# Patient Record
Sex: MALE | Race: WHITE | Employment: UNEMPLOYED | URBAN - METROPOLITAN AREA
[De-identification: names, ages, dates, MRNs, and addresses within clinical notes are randomized per-mention and may not be internally consistent; named-entity substitution may affect disease eponyms.]

---

## 2020-10-08 ENCOUNTER — HOSPITAL ENCOUNTER (EMERGENCY)
Age: 65
Discharge: HOME | End: 2020-10-09
Attending: EMERGENCY MEDICINE
Payer: COMMERCIAL

## 2020-10-08 ENCOUNTER — APPOINTMENT (OUTPATIENT)
Dept: CT IMAGING | Age: 65
End: 2020-10-08
Payer: COMMERCIAL

## 2020-10-08 VITALS
TEMPERATURE: 98.6 F | SYSTOLIC BLOOD PRESSURE: 131 MMHG | RESPIRATION RATE: 16 BRPM | DIASTOLIC BLOOD PRESSURE: 96 MMHG | OXYGEN SATURATION: 96 % | HEART RATE: 85 BPM

## 2020-10-08 LAB
A/G RATIO: 1.6 (ref 1.1–2.2)
ABO/RH: NORMAL
ALBUMIN SERPL-MCNC: 4.2 G/DL (ref 3.4–5)
ALP BLD-CCNC: 66 U/L (ref 40–129)
ALT SERPL-CCNC: 12 U/L (ref 10–40)
ANION GAP SERPL CALCULATED.3IONS-SCNC: 11 MMOL/L (ref 3–16)
ANTIBODY SCREEN: NORMAL
AST SERPL-CCNC: 15 U/L (ref 15–37)
BASOPHILS ABSOLUTE: 0 K/UL (ref 0–0.2)
BASOPHILS RELATIVE PERCENT: 0.3 %
BILIRUB SERPL-MCNC: 0.3 MG/DL (ref 0–1)
BUN BLDV-MCNC: 26 MG/DL (ref 7–20)
CALCIUM SERPL-MCNC: 9.6 MG/DL (ref 8.3–10.6)
CHLORIDE BLD-SCNC: 99 MMOL/L (ref 99–110)
CO2: 27 MMOL/L (ref 21–32)
CREAT SERPL-MCNC: 1 MG/DL (ref 0.8–1.3)
EOSINOPHILS ABSOLUTE: 0.1 K/UL (ref 0–0.6)
EOSINOPHILS RELATIVE PERCENT: 1.3 %
GFR AFRICAN AMERICAN: >60
GFR NON-AFRICAN AMERICAN: >60
GLOBULIN: 2.7 G/DL
GLUCOSE BLD-MCNC: 110 MG/DL (ref 70–99)
HCT VFR BLD CALC: 39.1 % (ref 40.5–52.5)
HEMOGLOBIN: 13.1 G/DL (ref 13.5–17.5)
LYMPHOCYTES ABSOLUTE: 0.8 K/UL (ref 1–5.1)
LYMPHOCYTES RELATIVE PERCENT: 7.8 %
MCH RBC QN AUTO: 33.3 PG (ref 26–34)
MCHC RBC AUTO-ENTMCNC: 33.6 G/DL (ref 31–36)
MCV RBC AUTO: 99.3 FL (ref 80–100)
MONOCYTES ABSOLUTE: 0.5 K/UL (ref 0–1.3)
MONOCYTES RELATIVE PERCENT: 4.8 %
NEUTROPHILS ABSOLUTE: 8.6 K/UL (ref 1.7–7.7)
NEUTROPHILS RELATIVE PERCENT: 85.8 %
PDW BLD-RTO: 13.9 % (ref 12.4–15.4)
PLATELET # BLD: 193 K/UL (ref 135–450)
PMV BLD AUTO: 7.9 FL (ref 5–10.5)
POTASSIUM SERPL-SCNC: 4.5 MMOL/L (ref 3.5–5.1)
RBC # BLD: 3.94 M/UL (ref 4.2–5.9)
REASON FOR REJECTION: NORMAL
REJECTED TEST: NORMAL
SODIUM BLD-SCNC: 137 MMOL/L (ref 136–145)
TOTAL PROTEIN: 6.9 G/DL (ref 6.4–8.2)
WBC # BLD: 10 K/UL (ref 4–11)

## 2020-10-08 PROCEDURE — 96375 TX/PRO/DX INJ NEW DRUG ADDON: CPT

## 2020-10-08 PROCEDURE — 99291 CRITICAL CARE FIRST HOUR: CPT

## 2020-10-08 PROCEDURE — 86901 BLOOD TYPING SEROLOGIC RH(D): CPT

## 2020-10-08 PROCEDURE — 85025 COMPLETE CBC W/AUTO DIFF WBC: CPT

## 2020-10-08 PROCEDURE — 72125 CT NECK SPINE W/O DYE: CPT

## 2020-10-08 PROCEDURE — 86850 RBC ANTIBODY SCREEN: CPT

## 2020-10-08 PROCEDURE — 96374 THER/PROPH/DIAG INJ IV PUSH: CPT

## 2020-10-08 PROCEDURE — 86900 BLOOD TYPING SEROLOGIC ABO: CPT

## 2020-10-08 PROCEDURE — 70450 CT HEAD/BRAIN W/O DYE: CPT

## 2020-10-08 PROCEDURE — 6360000002 HC RX W HCPCS: Performed by: EMERGENCY MEDICINE

## 2020-10-08 PROCEDURE — 71260 CT THORAX DX C+: CPT

## 2020-10-08 PROCEDURE — 6360000002 HC RX W HCPCS: Performed by: PHYSICIAN ASSISTANT

## 2020-10-08 PROCEDURE — 80053 COMPREHEN METABOLIC PANEL: CPT

## 2020-10-08 PROCEDURE — 36415 COLL VENOUS BLD VENIPUNCTURE: CPT

## 2020-10-08 RX ORDER — QUETIAPINE FUMARATE 25 MG/1
25 TABLET, FILM COATED ORAL EVERY MORNING
COMMUNITY

## 2020-10-08 RX ORDER — HYDROMORPHONE HYDROCHLORIDE 1 MG/ML
1 INJECTION, SOLUTION INTRAMUSCULAR; INTRAVENOUS; SUBCUTANEOUS
Status: DISCONTINUED | OUTPATIENT
Start: 2020-10-08 | End: 2020-10-09 | Stop reason: HOSPADM

## 2020-10-08 RX ORDER — DONEPEZIL HYDROCHLORIDE 5 MG/1
5 TABLET, FILM COATED ORAL NIGHTLY
COMMUNITY

## 2020-10-08 RX ORDER — ACETAMINOPHEN 325 MG/1
650 TABLET ORAL EVERY 6 HOURS PRN
COMMUNITY

## 2020-10-08 RX ORDER — DULOXETIN HYDROCHLORIDE 60 MG/1
60 CAPSULE, DELAYED RELEASE ORAL DAILY
COMMUNITY

## 2020-10-08 RX ORDER — FENTANYL CITRATE 50 UG/ML
50 INJECTION, SOLUTION INTRAMUSCULAR; INTRAVENOUS ONCE
Status: COMPLETED | OUTPATIENT
Start: 2020-10-08 | End: 2020-10-08

## 2020-10-08 RX ORDER — LEVOTHYROXINE SODIUM 0.15 MG/1
150 TABLET ORAL DAILY
COMMUNITY

## 2020-10-08 RX ORDER — ONDANSETRON 4 MG/1
4 TABLET, FILM COATED ORAL EVERY 8 HOURS PRN
COMMUNITY

## 2020-10-08 RX ORDER — HYDROMORPHONE HYDROCHLORIDE 1 MG/ML
1 INJECTION, SOLUTION INTRAMUSCULAR; INTRAVENOUS; SUBCUTANEOUS ONCE
Status: COMPLETED | OUTPATIENT
Start: 2020-10-08 | End: 2020-10-08

## 2020-10-08 RX ORDER — QUETIAPINE FUMARATE 25 MG/1
25 TABLET, FILM COATED ORAL NIGHTLY
COMMUNITY

## 2020-10-08 RX ORDER — OXYCODONE HCL 20 MG/1
20 TABLET, FILM COATED, EXTENDED RELEASE ORAL EVERY 12 HOURS
COMMUNITY

## 2020-10-08 RX ADMIN — HYDROMORPHONE HYDROCHLORIDE 1 MG: 1 INJECTION, SOLUTION INTRAMUSCULAR; INTRAVENOUS; SUBCUTANEOUS at 20:03

## 2020-10-08 RX ADMIN — FENTANYL CITRATE 50 MCG: 50 INJECTION, SOLUTION INTRAMUSCULAR; INTRAVENOUS at 17:24

## 2020-10-08 RX ADMIN — HYDROMORPHONE HYDROCHLORIDE 1 MG: 1 INJECTION, SOLUTION INTRAMUSCULAR; INTRAVENOUS; SUBCUTANEOUS at 18:21

## 2020-10-08 ASSESSMENT — ENCOUNTER SYMPTOMS
DIARRHEA: 0
VOMITING: 0
COUGH: 0
ABDOMINAL PAIN: 0
NAUSEA: 0
RHINORRHEA: 0
WHEEZING: 0
SHORTNESS OF BREATH: 0

## 2020-10-08 ASSESSMENT — PAIN SCALES - GENERAL
PAINLEVEL_OUTOF10: 10

## 2020-10-08 NOTE — ED PROVIDER NOTES
[address] ENCOUNTER        Pt Name: Daniel Santillan  MRN: [id number]  [place] 1955  Date of evaluation: 10/8/2020  Provider: Beth Francois PA-C  PCP: Renetta Titus     I have seen and evaluated this patient with my supervising physician Terrance Curiel MD.    [address] [place] [address]       Chief Complaint   Patient presents with   Conora My     PT arrived via EMS from [facility] with c/o falling and hitting head. PT in C Collar upon arrival to ED. PT with L shoulder pain, head and ear pain. PT with Left nare bleeding and Left ear bleeding from inside ear. HISTORY OF PRESENT ILLNESS   (Location, Timing/Onset, Context/Setting, Quality, Duration, Modifying Factors, Severity, Associated Signs and Symptoms)  Note limiting factors. Daniel Santillan is a 60 y.o. male who presents for evaluation of head injury status post mechanical fall that occurred just prior to arrival.  Patient states he was sitting on the edge of a 3 to 4 foot wall when he fell backwards, landing directly on his head and left shoulder. They he denies any loss of consciousness. He is not anticoagulated. He does report head and left shoulder pain. He states he is not able to move his shoulder secondary to pain. He denies any mid or low back pain. No numbness tingling or weakness distally. No saddle anesthesia, bladder retention or bowel incontinence. No dizziness/lightheadedness, weakness or visual disturbances. EMS reports the patient was very diaphoretic upon time of their arrival.  Patient denies any chest pain or shortness of breath. He is not currently diaphoretic. He denies any nausea or vomiting. No other injuries or complaints at this time. Nursing Notes were all reviewed and agreed with or any disagreements were addressed in the HPI.     REVIEW OF SYSTEMS    (2-9 systems for level 4, 10 or more for level 5)     Review of Systems Constitutional: Negative for appetite change, chills and fever. HENT: Positive for ear discharge and ear pain. Negative for congestion and rhinorrhea. Respiratory: Negative for cough, shortness of breath and wheezing. Cardiovascular: Negative for chest pain. Gastrointestinal: Negative for abdominal pain, diarrhea, nausea and vomiting. Genitourinary: Negative for difficulty urinating, dysuria and hematuria. Musculoskeletal: Positive for arthralgias (L SHOULDER) and neck pain. Negative for neck stiffness. Skin: Negative for rash. Neurological: Positive for headaches. Negative for dizziness, syncope, weakness and light-headedness. Positives and Pertinent negatives as per HPI. Except as noted above in the ROS, all other systems were reviewed and negative. PAST MEDICAL HISTORY   No past medical history on file. SURGICAL HISTORY   No past surgical history on file. CURRENTMEDICATIONS       Previous Medications    ACETAMINOPHEN (TYLENOL) 325 MG TABLET    Take 650 mg by mouth every 6 hours as needed for Pain    DONEPEZIL (ARICEPT) 5 MG TABLET    Take 5 mg by mouth nightly    DULOXETINE (CYMBALTA) 60 MG EXTENDED RELEASE CAPSULE    Take 60 mg by mouth daily    LEVOTHYROXINE (SYNTHROID) 150 MCG TABLET    Take 150 mcg by mouth Daily    ONDANSETRON (ZOFRAN) 4 MG TABLET    Take 4 mg by mouth every 8 hours as needed for Nausea or Vomiting    OXYCODONE (OXYCONTIN) 20 MG EXTENDED RELEASE TABLET    Take 20 mg by mouth every 12 hours. QUETIAPINE (SEROQUEL) 25 MG TABLET    Take 25 mg by mouth every morning    QUETIAPINE (SEROQUEL) 25 MG TABLET    Take 25 mg by mouth nightly         ALLERGIES     Patient has no known allergies. FAMILYHISTORY     No family history on file.        SOCIAL HISTORY       Social History     Tobacco Use    Smoking status: Not on file   Substance Use Topics    Alcohol use: Not on file    Drug use: Not on file       SCREENINGS             PHYSICAL EXAM    (up to Mental Status: He is alert and oriented to person, place, and time. Mental status is at baseline. GCS: GCS eye subscore is 4. GCS verbal subscore is 5. GCS motor subscore is 6. Cranial Nerves: Cranial nerves are intact. Sensory: Sensation is intact. Motor: Motor function is intact. Psychiatric:         Behavior: Behavior normal.         DIAGNOSTIC RESULTS   LABS:    Labs Reviewed   CBC WITH AUTO DIFFERENTIAL - Abnormal; Notable for the following components:       Result Value    RBC 3.94 (*)     Hemoglobin 13.1 (*)     Hematocrit 39.1 (*)     Neutrophils Absolute 8.6 (*)     Lymphocytes Absolute 0.8 (*)     All other components within normal limits    Narrative:     Performed at:  [facility] [address], [place], [facility] [place]note 2[place]   Phone [phone]   COMPREHENSIVE METABOLIC PANEL - Abnormal; Notable for the following components:    Glucose 110 (*)     BUN 26 (*)     All other components within normal limits    Narrative:     Pavan Fernández  [facility] tel. [phone],  Rejected Test Name PT,PTT/Called to:JODIE Julianna Solo, 10/08/2020 18:38,  by [facility]  Performed at:  [facility] [address], [facility] [place][note 2[place]   Phone [phone]    Narrative:     Pavan Rosadofort. [phone],  Rejected Test Name PT,PTT/Called to:Julianna FELICIANO, 10/08/2020 18:38,  by [facility]  Performed at:  [facility] [address], [place], [facility] [place][note 2] [place]   Phone [phone]   PROTIME-INR   APTT   TYPE AND SCREEN    Narrative:     Pavan Fernández  [facility] [phone],  Rejected Test Name PT,PTT/Called to:Julianna FELICIANO, 10/08/2020 18:38,  by [facility]  Performed at:  [facility] [address], [facility] [place]note 2] [place]   Phone [phone]       All other labs were within normal range or not returned as of this dictation. EKG:  All EKG's are 10/8/20 2003)           Patient presents for evaluation of head injury he appears uncomfortable, c-collar in place and but in no acute distress and nontoxic. Vitals are stable and he is afebrile. He is alert and oriented to person place time and situation. GCS 15. Cranial nerves II through XII are intact. He does have abrasion contusion to posterior left scalp remainder of scalp is atraumatic aside from active bleeding noted coming from his left ear. No right-sided hemotympanum. He has generalized cervical tenderness including midline and exquisite pain and swelling generally extending from the left side of neck into the shoulder. There is no bony step-offs crepitus obvious deformity or dislocation. Range of motion severely decreased secondary to pain but he appears neurovascularly intact distally. Strong distal pulses. Equal strength. Thoracic and lumbar spine are unremarkable with no midline tenderness or step-offs. Lungs are clear to auscultation bilaterally. There is no emphysema or crepitus. Chest is nontender and abdomen is benign. He was given fentanyl for symptomatic relief and will be reevaluated. CBC and CMP are unremarkable. CT the head shows no acute intracranial abnormality. CT the cervical spine is negative. CT of the chest shows a comminuted fracture of the left scapular body with no other acute osseous abnormalities. Patient is very current pain on reevaluation given multiple doses of Dilaudid. I do still maintain high clinical suspicion for basilar skull fracture due to bleeding from left ear and mechanism of injury with comminuted scapular fracture and oblique warrants transfer to trauma facility for further evaluation management of this. I spoke with the accepting physician at [facility], Dr. Amanda Natarajan, who is agreeable to this plan will resume care the patient following transfer. Patient family were informed of these results and update on plan of care. Agreeable.   He is stable for transfer at this time. Critical Care  There was a high probability of life-threatening clinical deterioration in the patient's condition requiring my urgent intervention. Total critical care time with the patient was 35 minutes excluding separately reportable procedures. Critical care required due to patients presentation concerning for acute life-threatening disease process and decompensation with multiple trauma. FINAL IMPRESSION      1. Injury of head, initial encounter    2. Fall, initial encounter    3. Closed nondisplaced fracture of body of left scapula, initial encounter          DISPOSITION/PLAN   DISPOSITION Decision To Transfer 10/08/2020 07:33:53 PM      PATIENT REFERREDTO:  No follow-up provider specified.     DISCHARGE MEDICATIONS:  New Prescriptions    No medications on file       DISCONTINUED MEDICATIONS:  Discontinued Medications    No medications on file              (Please note that portions of this note were completed with a voice recognition program.  Efforts were made to edit the dictations but occasionally words are mis-transcribed.)    Gallito Jones PA-C (electronically signed)           Everardo Hough PA-C  10/08/20 2051

## 2020-10-08 NOTE — ED PROVIDER NOTES
I independently performed a history and physical on Janna Vallejo. All diagnostic, treatment, and disposition decisions were made by myself in conjunction with the advanced practice provider. Briefly, this is a 60 y.o. male here for evaluation after a fall. Patient was sitting up for football when he fell backwards. He landed directly on his head and left shoulder, injuring those areas as well as his neck. He has increased pain in the left shoulder with any attempted movement. Patient is also having bleeding from his left ear. Pain is severe, rated 10 out of 10. .    On exam, patient has active, small hemorrhage from his left ear. There is bruising and swelling on the left side of his head where he injured it. Patient is awake and alert. Per family in room he is mildly confused does seem to have some trouble following everything I am saying. Patient has tenderness to the left shoulder posteriorly with deformity of shoulder blade. Any attempted movement of the left shoulder causes increased pain. Patient's breathing is unlabored. Speech is clear. FINAL IMPRESSION  1. Injury of head, initial encounter    2. Fall, initial encounter    3. Closed nondisplaced fracture of body of left scapula, initial encounter        Blood pressure (!) 131/96, pulse 85, temperature 98.6 °F (37 °C), temperature source Temporal, resp. rate 16, SpO2 96 %.      For further details of [address] emergency department encounter, please see documentation by advanced practice provider, Nilson Fletcher, [address] [facility], MD  10/09/20 6600

## 2020-10-08 NOTE — ED NOTES
PT medicated by Martha Robles.   PT taken to [address] [place], [place] [address], RN  10/08/20 [address], RN  10/08/20 3598

## 2020-10-08 NOTE — ED NOTES
PT pulled out his PIV. Placed another PIV, blue tube obtained and sent to lab. PT given a urinal.  Wife at bedside.        Walker Schroeder RN  10/08/20 2227

## 2020-10-08 NOTE — ED NOTES
Niyah at bedside to access PT. PT with IV in place upon arrival to ED. PT with NC in place, but on RA. PT RR easy with no S/S distress. VSS as charted. PT c/o pain to L shoulder and L ear. PT with blood actively dripping from L ear. PT with C Collar in place upon arrival to ED. PT given warm blanket and call light. Asking for pain medications at this time. Will continue to monitor.  PT with active DNR-CC     Hardeep De La Rosa RN  10/08/20 40 Alice Evangelista RN  10/08/20 8461

## 2020-10-08 NOTE — ED NOTES
Bed: 08  Expected date:   Expected time:   Means of arrival:   Comments:  marcelle Enciso RN  10/08/20 1705

## 2020-10-09 NOTE — ED NOTES
Pt updated on POC. Pt positioned for comfort. Call light in reach. Bed locked and in low position. Pt denies any needs or concerns at this time. Updated Daughter and pt.      Memo Starks RN  10/08/20 2008

## 2020-10-09 NOTE — ED NOTES
Nursing report called to Izzy Choi RN Charge at [facility] on receiving unit. RN accepts patient and has no further questions. Also gave report to Farzana with [facility]. Family updated with expectations of UC visitation.      Aurora Ely RN  10/08/20 6514

## 2020-10-14 ENCOUNTER — APPOINTMENT (OUTPATIENT)
Dept: CT IMAGING | Age: 65
End: 2020-10-14
Payer: COMMERCIAL

## 2020-10-14 ENCOUNTER — HOSPITAL ENCOUNTER (EMERGENCY)
Age: 65
Discharge: HOME | End: 2020-10-14
Attending: EMERGENCY MEDICINE
Payer: COMMERCIAL

## 2020-10-14 VITALS
RESPIRATION RATE: 12 BRPM | OXYGEN SATURATION: 91 % | WEIGHT: 201 LBS | HEART RATE: 57 BPM | HEIGHT: 74 IN | BODY MASS INDEX: 25.8 KG/M2 | TEMPERATURE: 98 F | DIASTOLIC BLOOD PRESSURE: 68 MMHG | SYSTOLIC BLOOD PRESSURE: 144 MMHG

## 2020-10-14 PROCEDURE — 70450 CT HEAD/BRAIN W/O DYE: CPT

## 2020-10-14 PROCEDURE — 99284 EMERGENCY DEPT VISIT MOD MDM: CPT

## 2020-10-14 RX ORDER — PREDNISONE 10 MG/1
TABLET ORAL
Qty: 44 TABLET | Refills: 0 | Status: SHIPPED | OUTPATIENT
Start: 2020-10-14 | End: 2020-10-24

## 2020-10-14 RX ORDER — VALACYCLOVIR HYDROCHLORIDE 1 G/1
1000 TABLET, FILM COATED ORAL 3 TIMES DAILY
Qty: 21 TABLET | Refills: 0 | Status: SHIPPED | OUTPATIENT
Start: 2020-10-14 | End: 2020-10-21

## 2020-10-14 ASSESSMENT — ENCOUNTER SYMPTOMS
ABDOMINAL PAIN: 0
DIARRHEA: 0
RHINORRHEA: 0
COUGH: 0
VOMITING: 0
SHORTNESS OF BREATH: 0
NAUSEA: 0
WHEEZING: 0

## 2020-10-14 ASSESSMENT — PAIN DESCRIPTION - PAIN TYPE: TYPE: ACUTE PAIN

## 2020-10-14 ASSESSMENT — PAIN DESCRIPTION - LOCATION: LOCATION: BACK;SHOULDER

## 2020-10-14 ASSESSMENT — PAIN SCALES - GENERAL: PAINLEVEL_OUTOF10: 7

## 2020-10-14 ASSESSMENT — PAIN DESCRIPTION - ORIENTATION: ORIENTATION: MID

## 2020-10-14 NOTE — ED PROVIDER NOTES
bilaterally  Neuro: Moves all extremities without deficit, left-sided facial droop throughout V1, 2, 3. He denies any numbness or tingling in the extremities dermatomes are intact to light touch throughout the facial regions. Tongue is midline, cranial nerves are otherwise completely intact        All diagnostic, treatment, and disposition decisions were made by myself in conjunction with the advanced practice provider. For all further details of the patient's emergency department visit, please see the advanced practice provider's documentation. Signs and symptoms consistent with a Bell's palsy on the left facial nerve, the patient is otherwise asymptomatic not having other stroke symptoms. His we have planned on placed on medications for symptom control instructed on close follow-up care with primary care physician strict return precautions provided. The patient is in agreement with this plan      RADIOLOGY  Ct Head Wo Contrast    Result Date: 10/14/2020  EXAMINATION: CT OF THE HEAD WITHOUT CONTRAST  10/14/2020 6:02 pm TECHNIQUE: CT of the head was performed without the administration of intravenous contrast. Dose modulation, iterative reconstruction, and/or weight based adjustment of the mA/kV was utilized to reduce the radiation dose to as low as reasonably achievable. COMPARISON: 10/08/2020 HISTORY: ORDERING SYSTEM PROVIDED HISTORY: facial droop TECHNOLOGIST PROVIDED HISTORY: Not a stroke alert per Dr. Jones Skill Has a \"code stroke\" or \"stroke alert\" been called? ->No Reason for exam:->facial droop Reason for Exam: facial droop Acuity: Acute Type of Exam: Initial FINDINGS: BRAIN/VENTRICLES: Motion artifact degrades image quality. There is no acute intracerebral hemorrhage or extra-axial fluid collection. There is mild cerebral atrophy with mild periventricular white matter small vessel ischemic disease. ORBITS: The orbits are unremarkable.  SINUSES: Air-fluid level is present in the left sphenoid sinus contrast. Multiplanar reformatted images are provided for review. Dose modulation, iterative reconstruction, and/or weight based adjustment of the mA/kV was utilized to reduce the radiation dose to as low as reasonably achievable. COMPARISON: None. HISTORY: ORDERING SYSTEM PROVIDED HISTORY: trauma TECHNOLOGIST PROVIDED HISTORY: Reason for exam:->trauma Reason for Exam: trama Acuity: Acute Type of Exam: Initial Mechanism of Injury: fall FINDINGS: Mediastinum: The thoracic aorta is normal in course and caliber. Minimal atherosclerotic plaque. The main pulmonary artery is normal in caliber. The heart is not enlarged with scattered coronary vascular calcification. No pericardial effusion. No significant mediastinal hematoma. Multiple small mediastinal and hilar nodes are noted which are not pathologic by size criteria. Lungs/pleura: Mild centrilobular emphysematous changes with an upper lobe predominance. No acute infiltrate, consolidation or edema. No pleural fluid or pneumothorax. The central airways are patent. Upper Abdomen: Multiple bilateral renal cysts. The visualized upper abdominal viscera are otherwise unremarkable. Soft Tissues/Bones: There is a comminuted fracture through the left scapular body. The fracture does not extend above the scapular spine or into the glenoid. There is a healing fracture of the posterolateral 5th rib. Additional healed bilateral rib fractures are noted. No other acute osseous or soft tissue abnormality. 1. Comminuted fracture of the left scapular body. No other acute osseous abnormality. Healed or healing bilateral rib fractures. 2. No acute pulmonary findings. Mild emphysematous changes. 3. No evidence of mediastinal hematoma. 4. Scattered coronary vascular calcification.      Ct Cervical Spine Wo Contrast    Result Date: 10/8/2020  EXAMINATION: CT OF THE CERVICAL SPINE WITHOUT CONTRAST 10/8/2020 5:25 pm TECHNIQUE: CT of the cervical spine was performed without the administration of intravenous contrast. Multiplanar reformatted images are provided for review. Dose modulation, iterative reconstruction, and/or weight based adjustment of the mA/kV was utilized to reduce the radiation dose to as low as reasonably achievable. COMPARISON: None. HISTORY: ORDERING SYSTEM PROVIDED HISTORY: fall, head injury TECHNOLOGIST PROVIDED HISTORY: If patient is on cardiac monitor and/or pulse ox, they may be taken off cardiac monitor and pulse ox, left on O2 if currently on. All monitors reattached when patient returns to room. Reason for exam:->fall, head injury Reason for Exam: fall, head injury Acuity: Acute Type of Exam: Initial Mechanism of Injury: fall, head injury FINDINGS: BONES/ALIGNMENT: There is no acute fracture or traumatic malalignment. DEGENERATIVE CHANGES: Moderate degenerative disc disease at C5-6 and C6-7. Mild spurring at the other vertebral levels. Multilevel facet arthropathy. Moderate degenerative change at the C1-2 articulation anteriorly. SOFT TISSUES: There is no prevertebral soft tissue swelling. Mild emphysematous changes at the lung apices. No acute abnormality of the cervical spine. Moderate degenerative disc disease at C5-6 and C6-7. Multilevel facet arthropathy. Comment: Please note this report has been produced using speech recognition software and may contain errors related to that system including errors in grammar, punctuation, and spelling, as well as words and phrases that may be inappropriate. If there are any questions or concerns please feel free to contact the dictating provider for clarification.       Corrine Zavaleta DO  10/14/20 1949

## 2020-10-14 NOTE — ED NOTES
Wife at bedside, Pt updated on POC. Pt positioned for comfort. Call light in reach. Bed locked and in low position. Pt denies any needs or concerns at this time.      Hong Whyte RN  10/14/20 5316

## 2020-10-14 NOTE — ED NOTES
Bed: 14  Expected date:   Expected time:   Means of arrival:   Comments:  marcelle Pringle, JODIE  10/14/20 4007

## 2020-10-14 NOTE — ED PROVIDER NOTES
[address] ENCOUNTER        Pt Name: Kwaku Bowser  MRN: [id number]  [place] 1955  Date of evaluation: 10/14/2020  Provider: Silvina Yepez PA-C  PCP: Gilford Keller     I have seen and evaluated this patient with my supervising physician Freddie Ramires, [facility]       Chief Complaint   Patient presents with   [facility], Pt had w=fall a week ago, staff noticed left sided droop earlier today. HISTORY OF PRESENT ILLNESS   (Location, Timing/Onset, Context/Setting, Quality, Duration, Modifying Factors, Severity, Associated Signs and Symptoms)  Note limiting factors. Kwaku Bowser is a 60 y.o. male who presents for evaluation of left-sided facial droop since around 10:00 this morning. Patient is completely asymptomatic. He has no dizziness/lightheadedness, weakness, visual disturbances or syncope. No chest pain shortness of breath. No vomiting nausea vomiting or diarrhea. Of note, patient is about 6 days status post traumatic head injury and left scapular fracture. He was transferred to  trauma center and then sent back to the nursing home. No history of similar symptoms or stroke. He is not anticoagulated. No new injury or trauma. He has no other complaints or concerns at this time. Nursing Notes were all reviewed and agreed with or any disagreements were addressed in the HPI. REVIEW OF SYSTEMS    (2-9 systems for level 4, 10 or more for level 5)     Review of Systems   Constitutional: Negative for appetite change, chills and fever. HENT: Negative for congestion and rhinorrhea. Respiratory: Negative for cough, shortness of breath and wheezing. Cardiovascular: Negative for chest pain. Gastrointestinal: Negative for abdominal pain, diarrhea, nausea and vomiting. Genitourinary: Negative for difficulty urinating, dysuria and hematuria.    Musculoskeletal: Negative for neck pain and neck stiffness. Skin: Negative for rash. Neurological: Positive for facial asymmetry. Negative for headaches. Positives and Pertinent negatives as per HPI. Except as noted above in the ROS, all other systems were reviewed and negative. PAST MEDICAL HISTORY   History reviewed. No pertinent past medical history. SURGICAL HISTORY   History reviewed. No pertinent surgical history. CURRENTMEDICATIONS       Previous Medications    ACETAMINOPHEN (TYLENOL) 325 MG TABLET    Take 650 mg by mouth every 6 hours as needed for Pain    DONEPEZIL (ARICEPT) 5 MG TABLET    Take 5 mg by mouth nightly    DULOXETINE (CYMBALTA) 60 MG EXTENDED RELEASE CAPSULE    Take 60 mg by mouth daily    LEVOTHYROXINE (SYNTHROID) 150 MCG TABLET    Take 150 mcg by mouth Daily    ONDANSETRON (ZOFRAN) 4 MG TABLET    Take 4 mg by mouth every 8 hours as needed for Nausea or Vomiting    OXYCODONE (OXYCONTIN) 20 MG EXTENDED RELEASE TABLET    Take 20 mg by mouth every 12 hours. QUETIAPINE (SEROQUEL) 25 MG TABLET    Take 25 mg by mouth every morning    QUETIAPINE (SEROQUEL) 25 MG TABLET    Take 25 mg by mouth nightly         ALLERGIES     Patient has no known allergies. FAMILYHISTORY     History reviewed. No pertinent family history. SOCIAL HISTORY       Social History     Tobacco Use    Smoking status: Current Every Day Smoker     Packs/day: 2.00     Start date: 10/14/1976    Smokeless tobacco: Never Used   Substance Use Topics    Alcohol use: Not Currently    Drug use: Not Currently       SCREENINGS   NIH Stroke Scale  NIH Stroke Scale Assessed: Yes  Interval: Baseline  Level of Consciousness (1a. ): Alert  LOC Questions (1b. ):  Answers both correctly  LOC Commands (1c. ): Performs both tasks correctly  Best Gaze (2. ): Normal  Visual (3. ): No visual loss  Facial Palsy (4. ): (!) Complete paralysis  Motor Arm, Left (5a. ): No drift  Motor Arm, Right (5b. ): No drift  Motor Leg, Left (6a. ): No range or not returned as of this dictation. EKG: All EKG's are interpreted by the Emergency Department Physician in the absence of a cardiologist.  Please see their note for interpretation of EKG. RADIOLOGY:   Non-plain film images such as CT, Ultrasound and MRI are read by the radiologist. Plain radiographic images are visualized and preliminarily interpreted by the ED Provider with the below findings:        Interpretation per the Radiologist below, if available at the time of this note:    CT HEAD WO CONTRAST   Final Result   1. Acute left temporal bone fracture. Recommend CT of the left temporal   bones for further evaluation. Ct Head Wo Contrast    Result Date: 10/8/2020  EXAMINATION: CT OF THE HEAD WITHOUT CONTRAST  10/8/2020 5:24 pm TECHNIQUE: CT of the head was performed without the administration of intravenous contrast. Dose modulation, iterative reconstruction, and/or weight based adjustment of the mA/kV was utilized to reduce the radiation dose to as low as reasonably achievable. COMPARISON: None. HISTORY: ORDERING SYSTEM PROVIDED HISTORY: fall, head injury TECHNOLOGIST PROVIDED HISTORY: Reason for exam:->fall, head injury Has a \"code stroke\" or \"stroke alert\" been called? ->No Reason for Exam: fall, head injury Acuity: Acute Type of Exam: Initial Mechanism of Injury: fall, head injury FINDINGS: BRAIN/VENTRICLES: There is no acute intracranial hemorrhage, mass effect or midline shift. No abnormal extra-axial fluid collection. The gray-white differentiation is maintained without evidence of an acute infarct. There is no evidence of hydrocephalus. ORBITS: The visualized portion of the orbits demonstrate no acute abnormality. SINUSES: Left mastoid effusion. There is an air-fluid level in the left sphenoid sinus. The remainder of the paranasal sinuses and right mastoid air cells are clear. SOFT TISSUES/SKULL:  No acute abnormality of the visualized skull or soft tissues.      No acute intracranial abnormality. Left mastoid effusion with air-fluid level in the left sphenoid sinus. Correlate for underlying sinusitis. Ct Chest W Contrast    Result Date: 10/9/2020  EXAMINATION: CT OF THE CHEST WITH CONTRAST 10/8/2020 5:40 pm TECHNIQUE: CT of the chest was performed with the administration of intravenous contrast. Multiplanar reformatted images are provided for review. Dose modulation, iterative reconstruction, and/or weight based adjustment of the mA/kV was utilized to reduce the radiation dose to as low as reasonably achievable. COMPARISON: None. HISTORY: ORDERING SYSTEM PROVIDED HISTORY: trauma TECHNOLOGIST PROVIDED HISTORY: Reason for exam:->trauma Reason for Exam: trama Acuity: Acute Type of Exam: Initial Mechanism of Injury: fall FINDINGS: Mediastinum: The thoracic aorta is normal in course and caliber. Minimal atherosclerotic plaque. The main pulmonary artery is normal in caliber. The heart is not enlarged with scattered coronary vascular calcification. No pericardial effusion. No significant mediastinal hematoma. Multiple small mediastinal and hilar nodes are noted which are not pathologic by size criteria. Lungs/pleura: Mild centrilobular emphysematous changes with an upper lobe predominance. No acute infiltrate, consolidation or edema. No pleural fluid or pneumothorax. The central airways are patent. Upper Abdomen: Multiple bilateral renal cysts. The visualized upper abdominal viscera are otherwise unremarkable. Soft Tissues/Bones: There is a comminuted fracture through the left scapular body. The fracture does not extend above the scapular spine or into the glenoid. There is a healing fracture of the posterolateral 5th rib. Additional healed bilateral rib fractures are noted. No other acute osseous or soft tissue abnormality. 1. Comminuted fracture of the left scapular body. No other acute osseous abnormality. Healed or healing bilateral rib fractures.  2. No acute TempSrc:  Oral     SpO2: 94% 95% 94% 91%   Weight:  201 lb (91.2 kg)     Height:  6' 2\" (1.88 m)         Patient was given the following medications:  Medications - No data to display        Patient presents for evaluation of left-sided facial droop x8 hours. On exam, he is resting comfortably in bed in no acute distress and nontoxic. Vitals are stable and he is afebrile. Lungs are clear to auscultation bilaterally, chest is nontender and abdomen is benign. Patient has pathopneumonic left-sided facial droop with involvement of the frontalis muscle consistent with Bell's palsy. There is no other left-sided involvement. Normal strength and sensation all extremities. Range of motion of the left upper extremity slightly decreased secondary to known scapular fracture and pain. He does have recent trauma including left temporal fracture, head injury from mechanical fall 6 days prior. Struggling with alcohol. This would not change plan management. Plan for head CT to make sure there is no hemorrhagic development. NIH for secondary to left-sided facial droop and mild aphasia. Otherwise unremarkable. Please see attending note for EKG interpretation. CT the head shows redemonstration of left temporal bone fracture. No other acute intracranial abnormalities. He will be discharged home with acyclovir and prednisone taper. Encouraged follow-up closely with PCP for reevaluation within the next 2 days. At this time there is no indication of head injury, encephalopathy, multiple sclerosis, TIA/CVA, seizures, dehydration, hypoglycemia, mass lesions, metabolic cause, cardiac arrhythmia, PE, GI bleeding or orthostatic hypotension. Patient is stable throughout ED course and safe for outpatient follow-up. Patient made aware of treatment plan and verbally understood and agreed. Patient stable for outpatient follow-up with their family doctor in 24-48 hours.   He is agreeable to this plan and stable for discharge at this time. FINAL IMPRESSION      1.  Bell's palsy          DISPOSITION/PLAN   DISPOSITION Decision To Discharge 10/14/2020 07:13:29 PM      PATIENT REFERREDTO:  Eliza Lucero  [address] [facility]  [phone]    Call   For a re-check in  2-3  days    [facility] Emergency Department  [address] [facility]  [phone]  Go to   If symptoms worsen      DISCHARGE MEDICATIONS:  New Prescriptions    PREDNISONE (DELTASONE) 10 MG TABLET    60 mg po x 5 days then   40 mg po x 2 days then  20 mg po x 2 days then  10 mg po x 2 days total of 11 days    VALACYCLOVIR (VALTREX) 1 G TABLET    Take 1 tablet by mouth 3 times daily for 7 days       DISCONTINUED MEDICATIONS:  Discontinued Medications    No medications on file              (Please note that portions of this note were completed with a voice recognition program.  Efforts were made to edit the dictations but occasionally words are mis-transcribed.)    Silvina Yepez PA-C (electronically signed)           [place], [place]  10/14/20 1915